# Patient Record
Sex: MALE | Race: WHITE | NOT HISPANIC OR LATINO | URBAN - METROPOLITAN AREA
[De-identification: names, ages, dates, MRNs, and addresses within clinical notes are randomized per-mention and may not be internally consistent; named-entity substitution may affect disease eponyms.]

---

## 2022-02-16 ENCOUNTER — LAB OUTSIDE AN ENCOUNTER (OUTPATIENT)
Dept: URBAN - METROPOLITAN AREA CLINIC 35 | Facility: CLINIC | Age: 37
End: 2022-02-16

## 2022-02-16 ENCOUNTER — OFFICE VISIT (OUTPATIENT)
Dept: URBAN - METROPOLITAN AREA CLINIC 35 | Facility: CLINIC | Age: 37
End: 2022-02-16
Payer: COMMERCIAL

## 2022-02-16 VITALS
DIASTOLIC BLOOD PRESSURE: 80 MMHG | BODY MASS INDEX: 29.03 KG/M2 | HEART RATE: 70 BPM | OXYGEN SATURATION: 98 % | HEIGHT: 69 IN | SYSTOLIC BLOOD PRESSURE: 140 MMHG | WEIGHT: 196 LBS

## 2022-02-16 DIAGNOSIS — R13.19 ESOPHAGEAL DYSPHAGIA: ICD-10-CM

## 2022-02-16 DIAGNOSIS — R12 HEARTBURN: ICD-10-CM

## 2022-02-16 DIAGNOSIS — R14.0 BLOATING SYMPTOM: ICD-10-CM

## 2022-02-16 PROCEDURE — 99204 OFFICE O/P NEW MOD 45 MIN: CPT | Performed by: INTERNAL MEDICINE

## 2022-02-16 PROCEDURE — 99244 OFF/OP CNSLTJ NEW/EST MOD 40: CPT | Performed by: INTERNAL MEDICINE

## 2022-02-16 RX ORDER — OMEPRAZOLE 20 MG/1
1 CAPSULE 30 MINUTES BEFORE MORNING MEAL CAPSULE, DELAYED RELEASE ORAL ONCE A DAY
Status: ACTIVE | COMMUNITY

## 2022-02-16 RX ORDER — ATORVASTATIN CALCIUM 40 MG/1
1 TABLET TABLET, FILM COATED ORAL ONCE A DAY
Status: ACTIVE | COMMUNITY

## 2022-02-16 RX ORDER — OMEPRAZOLE 20 MG/1
1 CAPSULE 30 MINUTES BEFORE MORNING MEAL CAPSULE, DELAYED RELEASE ORAL ONCE A DAY
Qty: 30 | Refills: 0 | OUTPATIENT
Start: 2022-02-16

## 2022-02-16 NOTE — HPI-TODAY'S VISIT:
36 year old male presents today with complaints of burning and pressure in the esophagus, dysphagia with dry food (chicken, carrots). He would force himself to almost vomit for food to pass. Symptoms started 2019. He admits symptoms have resolved with the use Omeprazole 20 mg OTC. He has been taking it daily for a year in the morning.   He states that he has some irritation when he consumes ETOH.   He would like to know if he should continue on the Omeprazole?  He denies EGD or barium swallow.  Normal BM    GI MD Note: initially symptoms were of heartburn and bloating. Then it progressed to dysphagia. Patient also mentions he has bloating with gluten and dairy.  BM's are NL. No visible blood or melena. No weight loss Patient's father has the same upper GI issues, told to have GERD and has been on Prilosec for yrs.

## 2022-03-11 ENCOUNTER — WEB ENCOUNTER (OUTPATIENT)
Dept: URBAN - METROPOLITAN AREA SURGERY CENTER 8 | Facility: SURGERY CENTER | Age: 37
End: 2022-03-11

## 2022-03-16 ENCOUNTER — TELEPHONE ENCOUNTER (OUTPATIENT)
Dept: URBAN - METROPOLITAN AREA CLINIC 35 | Facility: CLINIC | Age: 37
End: 2022-03-16

## 2022-03-16 ENCOUNTER — OFFICE VISIT (OUTPATIENT)
Dept: URBAN - METROPOLITAN AREA SURGERY CENTER 8 | Facility: SURGERY CENTER | Age: 37
End: 2022-03-16
Payer: COMMERCIAL

## 2022-03-16 DIAGNOSIS — K31.7 BENIGN GASTRIC POLYP: ICD-10-CM

## 2022-03-16 DIAGNOSIS — K22.89 ESOPHAGEAL BLEED, NON-VARICEAL: ICD-10-CM

## 2022-03-16 DIAGNOSIS — R13.19 CERVICAL DYSPHAGIA: ICD-10-CM

## 2022-03-16 DIAGNOSIS — R14.0 ABDOMINAL BLOATING: ICD-10-CM

## 2022-03-16 DIAGNOSIS — K31.89 ACQUIRED DEFORMITY OF DUODENUM: ICD-10-CM

## 2022-03-16 DIAGNOSIS — R12 BURNING REFLUX: ICD-10-CM

## 2022-03-16 PROCEDURE — 43239 EGD BIOPSY SINGLE/MULTIPLE: CPT | Performed by: INTERNAL MEDICINE

## 2022-03-16 PROCEDURE — G8907 PT DOC NO EVENTS ON DISCHARG: HCPCS | Performed by: INTERNAL MEDICINE

## 2022-03-16 PROCEDURE — 43248 EGD GUIDE WIRE INSERTION: CPT | Performed by: INTERNAL MEDICINE

## 2022-03-16 RX ORDER — OMEPRAZOLE 20 MG/1
1 CAPSULE 30 MINUTES BEFORE MORNING MEAL CAPSULE, DELAYED RELEASE ORAL ONCE A DAY
Status: ACTIVE | COMMUNITY

## 2022-03-16 RX ORDER — OMEPRAZOLE 20 MG/1
1 CAPSULE 30 MINUTES BEFORE MORNING MEAL CAPSULE, DELAYED RELEASE ORAL ONCE A DAY
Qty: 30 | Refills: 0 | Status: ACTIVE | COMMUNITY
Start: 2022-02-16

## 2022-03-16 RX ORDER — ATORVASTATIN CALCIUM 40 MG/1
1 TABLET TABLET, FILM COATED ORAL ONCE A DAY
Status: ACTIVE | COMMUNITY

## 2022-03-16 RX ORDER — OMEPRAZOLE 40 MG/1
1 CAPSULE 30 MINUTES BEFORE MORNING MEAL CAPSULE, DELAYED RELEASE ORAL ONCE A DAY
Qty: 90 | Refills: 1 | OUTPATIENT
Start: 2022-03-16

## 2022-03-30 ENCOUNTER — DASHBOARD ENCOUNTERS (OUTPATIENT)
Age: 37
End: 2022-03-30

## 2022-03-30 ENCOUNTER — OFFICE VISIT (OUTPATIENT)
Dept: URBAN - METROPOLITAN AREA CLINIC 35 | Facility: CLINIC | Age: 37
End: 2022-03-30
Payer: COMMERCIAL

## 2022-03-30 VITALS
WEIGHT: 192 LBS | OXYGEN SATURATION: 97 % | SYSTOLIC BLOOD PRESSURE: 116 MMHG | HEIGHT: 69 IN | BODY MASS INDEX: 28.44 KG/M2 | DIASTOLIC BLOOD PRESSURE: 82 MMHG | HEART RATE: 67 BPM

## 2022-03-30 DIAGNOSIS — R13.19 ESOPHAGEAL DYSPHAGIA: ICD-10-CM

## 2022-03-30 DIAGNOSIS — R12 HEARTBURN: ICD-10-CM

## 2022-03-30 DIAGNOSIS — K21.9 NONEROSIVE ESOPHAGEAL REFLUX DISEASE: ICD-10-CM

## 2022-03-30 DIAGNOSIS — R14.0 BLOATING SYMPTOM: ICD-10-CM

## 2022-03-30 PROBLEM — 235595009: Status: ACTIVE | Noted: 2022-03-30

## 2022-03-30 PROCEDURE — 99213 OFFICE O/P EST LOW 20 MIN: CPT | Performed by: INTERNAL MEDICINE

## 2022-03-30 RX ORDER — PANTOPRAZOLE SODIUM 20 MG/1
1 TABLET TABLET, DELAYED RELEASE ORAL
Qty: 90 | Refills: 1 | OUTPATIENT
Start: 2022-03-30

## 2022-03-30 RX ORDER — ATORVASTATIN CALCIUM 40 MG/1
1 TABLET TABLET, FILM COATED ORAL ONCE A DAY
Status: ACTIVE | COMMUNITY

## 2022-03-30 RX ORDER — OMEPRAZOLE 40 MG/1
1 CAPSULE 30 MINUTES BEFORE MORNING MEAL CAPSULE, DELAYED RELEASE ORAL ONCE A DAY
Qty: 90 | Refills: 1 | Status: ACTIVE | COMMUNITY
Start: 2022-03-16

## 2022-03-30 RX ORDER — OMEPRAZOLE 20 MG/1
1 CAPSULE 30 MINUTES BEFORE MORNING MEAL CAPSULE, DELAYED RELEASE ORAL ONCE A DAY
Status: ON HOLD | COMMUNITY

## 2022-03-30 RX ORDER — OMEPRAZOLE 20 MG/1
1 CAPSULE 30 MINUTES BEFORE MORNING MEAL CAPSULE, DELAYED RELEASE ORAL ONCE A DAY
Qty: 30 | Refills: 0 | Status: ON HOLD | COMMUNITY
Start: 2022-02-16

## 2022-03-30 NOTE — HPI-TODAY'S VISIT:
Patient presents today for follow up to his EGD. Patient had EGD completed on 03.16.2022. Since the procedure patient  denies dysphagia, Globus, changes in appetite, and changes in bowel habits. Patient currently taking Omeprazole 40 mg with relief of his symptoms. He denies any new symptoms at this time.   EGD performed for heartburn, dysphagia and bloating. NL esophagus. Empiric dilatation performed with 51 and 54 Fr Savary dilators with no resistance. Biopsies of proximal and distal esophagus performed and were NL. No findings of EOE. No HH. Mild gastric erythema. Gastric bx's were NL.  Few small gastric polyps; benign FGP. NL duodenum. Duodenal bx's also NL.

## 2022-07-27 ENCOUNTER — OFFICE VISIT (OUTPATIENT)
Dept: URBAN - METROPOLITAN AREA CLINIC 35 | Facility: CLINIC | Age: 37
End: 2022-07-27

## 2022-07-27 NOTE — HPI-TODAY'S VISIT:
Patient admits longstanding history/recent onset of heartburn/reflux. What is it?  Describe it.  When did it start?  How often do episodes occur?  How long do episodes last?  What makes it worse?  What makes it better?  Have you tried any previous treatment?